# Patient Record
Sex: FEMALE | Race: WHITE | Employment: UNEMPLOYED | URBAN - METROPOLITAN AREA
[De-identification: names, ages, dates, MRNs, and addresses within clinical notes are randomized per-mention and may not be internally consistent; named-entity substitution may affect disease eponyms.]

---

## 2018-11-29 ENCOUNTER — HOSPITAL ENCOUNTER (OUTPATIENT)
Dept: OCCUPATIONAL MEDICINE | Age: 38
Discharge: HOME | End: 2018-11-29

## 2018-11-29 DIAGNOSIS — Z13.9 SCREENING PROCEDURE: ICD-10-CM

## 2020-04-24 ENCOUNTER — APPOINTMENT (OUTPATIENT)
Dept: GENERAL RADIOLOGY | Age: 40
DRG: 871 | End: 2020-04-24
Payer: COMMERCIAL

## 2020-04-24 ENCOUNTER — HOSPITAL ENCOUNTER (INPATIENT)
Age: 40
LOS: 5 days | Discharge: HOME | DRG: 871 | End: 2020-04-29
Attending: INTERNAL MEDICINE | Admitting: INTERNAL MEDICINE
Payer: COMMERCIAL

## 2020-04-24 ENCOUNTER — APPOINTMENT (OUTPATIENT)
Dept: CT IMAGING | Age: 40
DRG: 871 | End: 2020-04-24
Payer: COMMERCIAL

## 2020-04-24 PROBLEM — R05.9 COUGH: Status: ACTIVE | Noted: 2020-04-24

## 2020-04-24 LAB
ALBUMIN SERPL-MCNC: 4 GM/DL (ref 3.4–5)
ALP BLD-CCNC: 74 IU/L (ref 40–129)
ALT SERPL-CCNC: 13 U/L (ref 10–40)
ANION GAP SERPL CALCULATED.3IONS-SCNC: 13 MMOL/L (ref 4–16)
AST SERPL-CCNC: 14 IU/L (ref 15–37)
BASOPHILS ABSOLUTE: 0 K/CU MM
BASOPHILS RELATIVE PERCENT: 0.2 % (ref 0–1)
BILIRUB SERPL-MCNC: 0.7 MG/DL (ref 0–1)
BUN BLDV-MCNC: 7 MG/DL (ref 6–23)
CALCIUM SERPL-MCNC: 8.5 MG/DL (ref 8.3–10.6)
CHLORIDE BLD-SCNC: 99 MMOL/L (ref 99–110)
CO2: 25 MMOL/L (ref 21–32)
CREAT SERPL-MCNC: 0.9 MG/DL (ref 0.6–1.1)
D DIMER: 328 NG/ML(DDU)
DIFFERENTIAL TYPE: ABNORMAL
EOSINOPHILS ABSOLUTE: 0.1 K/CU MM
EOSINOPHILS RELATIVE PERCENT: 0.8 % (ref 0–3)
GFR AFRICAN AMERICAN: >60 ML/MIN/1.73M2
GFR NON-AFRICAN AMERICAN: >60 ML/MIN/1.73M2
GLUCOSE BLD-MCNC: 114 MG/DL (ref 70–99)
HCG QUALITATIVE: NEGATIVE
HCT VFR BLD CALC: 42.5 % (ref 37–47)
HEMOGLOBIN: 14.5 GM/DL (ref 12.5–16)
HIGH SENSITIVE C-REACTIVE PROTEIN: 111.1 MG/L
IMMATURE NEUTROPHIL %: 0.4 % (ref 0–0.43)
LACTIC ACID, SEPSIS: 0.9 MMOL/L (ref 0.5–1.9)
LYMPHOCYTES ABSOLUTE: 1 K/CU MM
LYMPHOCYTES RELATIVE PERCENT: 7.5 % (ref 24–44)
MCH RBC QN AUTO: 32.9 PG (ref 27–31)
MCHC RBC AUTO-ENTMCNC: 34.1 % (ref 32–36)
MCV RBC AUTO: 96.4 FL (ref 78–100)
MONOCYTES ABSOLUTE: 1 K/CU MM
MONOCYTES RELATIVE PERCENT: 7.9 % (ref 0–4)
NUCLEATED RBC %: 0 %
PDW BLD-RTO: 12.4 % (ref 11.7–14.9)
PLATELET # BLD: 292 K/CU MM (ref 140–440)
PMV BLD AUTO: 9.8 FL (ref 7.5–11.1)
POTASSIUM SERPL-SCNC: 3.6 MMOL/L (ref 3.5–5.1)
PRO-BNP: 15.16 PG/ML
PROCALCITONIN: 0.06
RBC # BLD: 4.41 M/CU MM (ref 4.2–5.4)
SEGMENTED NEUTROPHILS ABSOLUTE COUNT: 10.6 K/CU MM
SEGMENTED NEUTROPHILS RELATIVE PERCENT: 83.2 % (ref 36–66)
SODIUM BLD-SCNC: 137 MMOL/L (ref 135–145)
TOTAL IMMATURE NEUTOROPHIL: 0.05 K/CU MM
TOTAL NUCLEATED RBC: 0 K/CU MM
TOTAL PROTEIN: 6.9 GM/DL (ref 6.4–8.2)
TROPONIN T: <0.01 NG/ML
WBC # BLD: 12.7 K/CU MM (ref 4–10.5)

## 2020-04-24 PROCEDURE — 6360000004 HC RX CONTRAST MEDICATION: Performed by: PHYSICIAN ASSISTANT

## 2020-04-24 PROCEDURE — 6360000002 HC RX W HCPCS: Performed by: PHYSICIAN ASSISTANT

## 2020-04-24 PROCEDURE — 93010 ELECTROCARDIOGRAM REPORT: CPT | Performed by: INTERNAL MEDICINE

## 2020-04-24 PROCEDURE — 87899 AGENT NOS ASSAY W/OPTIC: CPT

## 2020-04-24 PROCEDURE — 2060000000 HC ICU INTERMEDIATE R&B

## 2020-04-24 PROCEDURE — 71275 CT ANGIOGRAPHY CHEST: CPT

## 2020-04-24 PROCEDURE — 96366 THER/PROPH/DIAG IV INF ADDON: CPT

## 2020-04-24 PROCEDURE — 80053 COMPREHEN METABOLIC PANEL: CPT

## 2020-04-24 PROCEDURE — 83605 ASSAY OF LACTIC ACID: CPT

## 2020-04-24 PROCEDURE — 84703 CHORIONIC GONADOTROPIN ASSAY: CPT

## 2020-04-24 PROCEDURE — U0002 COVID-19 LAB TEST NON-CDC: HCPCS

## 2020-04-24 PROCEDURE — 96365 THER/PROPH/DIAG IV INF INIT: CPT

## 2020-04-24 PROCEDURE — 71045 X-RAY EXAM CHEST 1 VIEW: CPT

## 2020-04-24 PROCEDURE — 2580000003 HC RX 258: Performed by: PHYSICIAN ASSISTANT

## 2020-04-24 PROCEDURE — 84145 PROCALCITONIN (PCT): CPT

## 2020-04-24 PROCEDURE — 99291 CRITICAL CARE FIRST HOUR: CPT

## 2020-04-24 PROCEDURE — 1200000000 HC SEMI PRIVATE

## 2020-04-24 PROCEDURE — 85025 COMPLETE CBC W/AUTO DIFF WBC: CPT

## 2020-04-24 PROCEDURE — 94761 N-INVAS EAR/PLS OXIMETRY MLT: CPT

## 2020-04-24 PROCEDURE — 84484 ASSAY OF TROPONIN QUANT: CPT

## 2020-04-24 PROCEDURE — 85379 FIBRIN DEGRADATION QUANT: CPT

## 2020-04-24 PROCEDURE — 93005 ELECTROCARDIOGRAM TRACING: CPT | Performed by: PHYSICIAN ASSISTANT

## 2020-04-24 PROCEDURE — 83880 ASSAY OF NATRIURETIC PEPTIDE: CPT

## 2020-04-24 PROCEDURE — 86141 C-REACTIVE PROTEIN HS: CPT

## 2020-04-24 PROCEDURE — 2580000003 HC RX 258: Performed by: NURSE PRACTITIONER

## 2020-04-24 PROCEDURE — 87040 BLOOD CULTURE FOR BACTERIA: CPT

## 2020-04-24 RX ORDER — ACETAMINOPHEN 325 MG/1
650 TABLET ORAL EVERY 6 HOURS PRN
Status: DISCONTINUED | OUTPATIENT
Start: 2020-04-24 | End: 2020-04-29 | Stop reason: HOSPADM

## 2020-04-24 RX ORDER — ACETAMINOPHEN 650 MG/1
650 SUPPOSITORY RECTAL EVERY 6 HOURS PRN
Status: DISCONTINUED | OUTPATIENT
Start: 2020-04-24 | End: 2020-04-29 | Stop reason: HOSPADM

## 2020-04-24 RX ORDER — AZITHROMYCIN 250 MG/1
250 TABLET, FILM COATED ORAL DAILY
Status: CANCELLED | OUTPATIENT
Start: 2020-04-25 | End: 2020-04-29

## 2020-04-24 RX ORDER — ONDANSETRON 2 MG/ML
4 INJECTION INTRAMUSCULAR; INTRAVENOUS EVERY 6 HOURS PRN
Status: DISCONTINUED | OUTPATIENT
Start: 2020-04-24 | End: 2020-04-29 | Stop reason: HOSPADM

## 2020-04-24 RX ORDER — SODIUM CHLORIDE 0.9 % (FLUSH) 0.9 %
10 SYRINGE (ML) INJECTION EVERY 12 HOURS SCHEDULED
Status: DISCONTINUED | OUTPATIENT
Start: 2020-04-24 | End: 2020-04-29 | Stop reason: HOSPADM

## 2020-04-24 RX ORDER — 0.9 % SODIUM CHLORIDE 0.9 %
10 VIAL (ML) INJECTION
Status: COMPLETED | OUTPATIENT
Start: 2020-04-24 | End: 2020-04-24

## 2020-04-24 RX ORDER — LEVOFLOXACIN 5 MG/ML
750 INJECTION, SOLUTION INTRAVENOUS ONCE
Status: COMPLETED | OUTPATIENT
Start: 2020-04-24 | End: 2020-04-24

## 2020-04-24 RX ORDER — LEVOFLOXACIN 5 MG/ML
750 INJECTION, SOLUTION INTRAVENOUS EVERY 24 HOURS
Status: DISCONTINUED | OUTPATIENT
Start: 2020-04-25 | End: 2020-04-25

## 2020-04-24 RX ORDER — SODIUM CHLORIDE 0.9 % (FLUSH) 0.9 %
10 SYRINGE (ML) INJECTION PRN
Status: DISCONTINUED | OUTPATIENT
Start: 2020-04-24 | End: 2020-04-29 | Stop reason: HOSPADM

## 2020-04-24 RX ORDER — PROMETHAZINE HYDROCHLORIDE 25 MG/1
12.5 TABLET ORAL EVERY 6 HOURS PRN
Status: DISCONTINUED | OUTPATIENT
Start: 2020-04-24 | End: 2020-04-29 | Stop reason: HOSPADM

## 2020-04-24 RX ORDER — 0.9 % SODIUM CHLORIDE 0.9 %
1000 INTRAVENOUS SOLUTION INTRAVENOUS ONCE
Status: COMPLETED | OUTPATIENT
Start: 2020-04-24 | End: 2020-04-24

## 2020-04-24 RX ORDER — LEVOFLOXACIN 5 MG/ML
500 INJECTION, SOLUTION INTRAVENOUS ONCE
Status: DISCONTINUED | OUTPATIENT
Start: 2020-04-24 | End: 2020-04-24

## 2020-04-24 RX ORDER — POLYETHYLENE GLYCOL 3350 17 G/17G
17 POWDER, FOR SOLUTION ORAL DAILY PRN
Status: DISCONTINUED | OUTPATIENT
Start: 2020-04-24 | End: 2020-04-29 | Stop reason: HOSPADM

## 2020-04-24 RX ORDER — SODIUM CHLORIDE 9 MG/ML
INJECTION, SOLUTION INTRAVENOUS CONTINUOUS
Status: DISCONTINUED | OUTPATIENT
Start: 2020-04-24 | End: 2020-04-25

## 2020-04-24 RX ADMIN — SODIUM CHLORIDE 1000 ML: 9 INJECTION, SOLUTION INTRAVENOUS at 16:04

## 2020-04-24 RX ADMIN — LEVOFLOXACIN 750 MG: 5 INJECTION, SOLUTION INTRAVENOUS at 17:55

## 2020-04-24 RX ADMIN — SODIUM CHLORIDE: 9 INJECTION, SOLUTION INTRAVENOUS at 22:34

## 2020-04-24 RX ADMIN — IOPAMIDOL 75 ML: 755 INJECTION, SOLUTION INTRAVENOUS at 15:57

## 2020-04-24 RX ADMIN — SODIUM CHLORIDE, PRESERVATIVE FREE 10 ML: 5 INJECTION INTRAVENOUS at 22:35

## 2020-04-24 RX ADMIN — SODIUM CHLORIDE 10 ML: 9 INJECTION INTRAVENOUS at 15:57

## 2020-04-24 ASSESSMENT — PAIN SCALES - GENERAL
PAINLEVEL_OUTOF10: 0
PAINLEVEL_OUTOF10: 4

## 2020-04-24 ASSESSMENT — PAIN DESCRIPTION - DESCRIPTORS: DESCRIPTORS: ACHING

## 2020-04-24 ASSESSMENT — PAIN DESCRIPTION - PAIN TYPE: TYPE: ACUTE PAIN

## 2020-04-24 ASSESSMENT — PAIN DESCRIPTION - LOCATION: LOCATION: GENERALIZED

## 2020-04-24 NOTE — ED PROVIDER NOTES
Alb 4.0 3.4 - 5.0 GM/DL    Total Protein 6.9 6.4 - 8.2 GM/DL    Total Bilirubin 0.7 0.0 - 1.0 MG/DL    ALT 13 10 - 40 U/L    AST 14 (L) 15 - 37 IU/L    Alkaline Phosphatase 74 40 - 129 IU/L    GFR Non-African American >60 >60 mL/min/1.73m2    GFR African American >60 >60 mL/min/1.73m2    Anion Gap 13 4 - 16   HCG Serum, Qualitative   Result Value Ref Range    hCG Qual NEGATIVE    Brain Natriuretic Peptide   Result Value Ref Range    Pro-BNP 15.16 <300 PG/ML   Troponin   Result Value Ref Range    Troponin T <0.010 <0.01 NG/ML   D-dimer, Quantitative   Result Value Ref Range    D-Dimer, Quant 328 (H) <230 NG/mL(DDU)   Lactate, Sepsis   Result Value Ref Range    Lactic Acid, Sepsis 0.9 0.5 - 1.9 mMOL/L   EKG 12 Lead   Result Value Ref Range    Ventricular Rate 110 BPM    Atrial Rate 110 BPM    P-R Interval 170 ms    QRS Duration 82 ms    Q-T Interval 344 ms    QTc Calculation (Bazett) 465 ms    P Axis 57 degrees    R Axis 51 degrees    T Axis 20 degrees    Diagnosis       Sinus tachycardia  Possible Left atrial enlargement  Borderline ECG  No previous ECGs available  Confirmed by [facility] Mallika SIGALA ([id number]) on 4/24/2020 4:09:40 PM         EKG Interpretation  Please see ED physician's note for EKG interpretation - Dr. Reva Murphy      RADIOLOGY/PROCEDURES    CTA PULMONARY W CONTRAST   Final Result   1. Suboptimal bolus timing limits evaluation of the segmental and   subsegmental pulmonary arteries. No definite large embolus within limits of   this exam.  No large embolus within the right or left main pulmonary arteries. 2. Bilateral interlobular septal thickening with mild scattered bilateral   ground-glass opacities. This is an indeterminate appearance with imaging   findings which can be seen with viral pneumonia, though nonspecific and can   occur in a variety of infectious and noninfectious processes including   pulmonary edema.          XR CHEST PORTABLE   Preliminary Result   Bilateral interstitial and airspace Disposition:  Admission      Comment: Please note this report has been produced using speech recognition software and may contain errors related to that system including errors in grammar, punctuation, and spelling, as well as words and phrases that may be inappropriate. If there are any questions or concerns please feel free to contact the dictating provider for clarification.         Bill Cruz PA-C  04/24/20 1942

## 2020-04-24 NOTE — H&P
History and Physical      Name:  Aroldo Kuo /Age/Sex: 1980  (47 y.o. female)   MRN & CSN:  [id number] & [id number] Admission Date/Time: 2020  1:31 PM   Location:  ED04/ED-04 PCP: Sadia Light MD       Discussed patient with Dr. María Wilson and Plan:     Aroldo Kuo is a 47 y.o.  female  who presents with fever, cough, malaise, dyspnea on exertion    - Covid rule out  CTA chest without PE; has bilat ground glass opacities  Reports fever, dry cough, malaise  Emergent testing done in ED  Droplet and contact precautions  Empiric levaquin 750 IV q day   Check Blood cultures, procal, crp, RESP pcr, legionella, strep   IVF 0.9 overnight      Chronic  none    Discussed patient with ER physician     Diet General    DVT Prophylaxis [x] Lovenox, []  Heparin, [] SCDs, [] Ambulation   GI Prophylaxis [] PPI,  [] H2 Blocker,  [] Carafate,  [] Diet/Tube Feeds   Code Status Full   Disposition Patient requires continued admission due to cough, shortness of breath   MDM [] Low, [x] Moderate,[]  High  Patient's risk as above due to      [] One or more chronic illnesses with exacerbation progression      [x] Two or more stable chronic illnesses      [x] Undiagnosed new problem with uncertain prognosis      [] Elective major surgery      []Prescription drug management       History of Present Illness:     Chief Complaint: fever, cough, malaise, dyspnea on exertion  Aroldo Kuo is a 47 y.o.  female  who presents with the above complaints, onset 7 days ago for malaise, 2 days for dry cough and fever. Denies known exposure to PUI patients. Reports PMH of hypothyroidism but states she was taken off synthroid due to normal TSH levels. Reports mild SOB with exertion. Denies chest pain/pressure, abdominal pain, back pain, n/v, diarrhea. Denies regular alcohol use, illicit drug use.       Ten point ROS reviewed negative, unless as noted above    Objective:     No intake or output data in the 24 hours embolus within the right or left main pulmonary arteries.   2. Bilateral interlobular septal thickening with mild scattered bilateral  ground-glass opacities.  This is an indeterminate appearance with imaging  findings which can be seen with viral pneumonia, though nonspecific and can  occur in a variety of infectious and noninfectious processes including  pulmonary edema.     XR CHEST PORTABLE [[id number]] Collected: 04/24/20 1411     Order Status: Completed Updated: 04/24/20 1436     Narrative:       EXAMINATION:  ONE XRAY VIEW OF THE CHEST    4/24/2020 2:04 pm    COMPARISON:  November 29, 2018    HISTORY:  ORDERING SYSTEM PROVIDED HISTORY: shortness of breath  TECHNOLOGIST PROVIDED HISTORY:  Reason for exam:->shortness of breath  Reason for Exam: SOB  Acuity: Acute  Type of Exam: Initial    FINDINGS:  Bilaterally asymmetric interstitial and airspace disease confluent within the  suprahilar regions bilaterally and right lower lobe.  Differential includes  atypical infection or noncardiogenic edema.  Stable heart size and  mediastinal contours.     Impression:       Bilateral interstitial and airspace disease representing atypical infection  or noncardiogenic edema.         Sinus tachycardia   Possible Left atrial enlargement   Borderline ECG   No previous ECGs available   Confirmed by [facility] Kelvin SIGALA ([id number]) on 4/24/2020 4:09:40 PM     Electronically signed by MARS Shelton NP on 4/24/2020 at 6:26 PM

## 2020-04-24 NOTE — ED NOTES
Emergent disease panel sent down to lab; Elena COSTELLO got approval from [address]; lab made aware of the approval; [id number] Sanam Todd also aware of the emergent disease panel; Ce Stokes  04/24/20 9006

## 2020-04-24 NOTE — ED NOTES
[facility] (1-RH) approval code Evorn Innocent), Updated [address] spoke to Meg.       Moni Ibrahim RN  04/24/20 [id number]

## 2020-04-25 ENCOUNTER — APPOINTMENT (OUTPATIENT)
Dept: GENERAL RADIOLOGY | Age: 40
DRG: 871 | End: 2020-04-25
Payer: COMMERCIAL

## 2020-04-25 LAB
ANION GAP SERPL CALCULATED.3IONS-SCNC: 13 MMOL/L (ref 4–16)
BASOPHILS ABSOLUTE: 0 K/CU MM
BASOPHILS RELATIVE PERCENT: 0.2 % (ref 0–1)
BUN BLDV-MCNC: 2 MG/DL (ref 6–23)
CALCIUM SERPL-MCNC: 8.2 MG/DL (ref 8.3–10.6)
CHLORIDE BLD-SCNC: 100 MMOL/L (ref 99–110)
CO2: 21 MMOL/L (ref 21–32)
CREAT SERPL-MCNC: 0.5 MG/DL (ref 0.6–1.1)
DIFFERENTIAL TYPE: ABNORMAL
EOSINOPHILS ABSOLUTE: 0.1 K/CU MM
EOSINOPHILS RELATIVE PERCENT: 1.2 % (ref 0–3)
FERRITIN: 414 NG/ML (ref 15–150)
GFR AFRICAN AMERICAN: >60 ML/MIN/1.73M2
GFR NON-AFRICAN AMERICAN: >60 ML/MIN/1.73M2
GLUCOSE BLD-MCNC: 115 MG/DL (ref 70–99)
HCT VFR BLD CALC: 42.2 % (ref 37–47)
HEMOGLOBIN: 13.9 GM/DL (ref 12.5–16)
IMMATURE NEUTROPHIL %: 0.4 % (ref 0–0.43)
LACTATE DEHYDROGENASE: 127 IU/L (ref 120–246)
LYMPHOCYTES ABSOLUTE: 1 K/CU MM
LYMPHOCYTES RELATIVE PERCENT: 8.8 % (ref 24–44)
MCH RBC QN AUTO: 33.1 PG (ref 27–31)
MCHC RBC AUTO-ENTMCNC: 32.9 % (ref 32–36)
MCV RBC AUTO: 100.5 FL (ref 78–100)
MONOCYTES ABSOLUTE: 0.8 K/CU MM
MONOCYTES RELATIVE PERCENT: 7.5 % (ref 0–4)
NUCLEATED RBC %: 0 %
PDW BLD-RTO: 12.7 % (ref 11.7–14.9)
PLATELET # BLD: 272 K/CU MM (ref 140–440)
PMV BLD AUTO: 10.5 FL (ref 7.5–11.1)
POTASSIUM SERPL-SCNC: 3.8 MMOL/L (ref 3.5–5.1)
RBC # BLD: 4.2 M/CU MM (ref 4.2–5.4)
SEGMENTED NEUTROPHILS ABSOLUTE COUNT: 9 K/CU MM
SEGMENTED NEUTROPHILS RELATIVE PERCENT: 81.9 % (ref 36–66)
SODIUM BLD-SCNC: 134 MMOL/L (ref 135–145)
TOTAL IMMATURE NEUTOROPHIL: 0.04 K/CU MM
TOTAL NUCLEATED RBC: 0 K/CU MM
WBC # BLD: 11 K/CU MM (ref 4–10.5)

## 2020-04-25 PROCEDURE — 2580000003 HC RX 258: Performed by: HOSPITALIST

## 2020-04-25 PROCEDURE — 6360000002 HC RX W HCPCS: Performed by: NURSE PRACTITIONER

## 2020-04-25 PROCEDURE — 6370000000 HC RX 637 (ALT 250 FOR IP): Performed by: NURSE PRACTITIONER

## 2020-04-25 PROCEDURE — 87633 RESP VIRUS 12-25 TARGETS: CPT

## 2020-04-25 PROCEDURE — 94761 N-INVAS EAR/PLS OXIMETRY MLT: CPT

## 2020-04-25 PROCEDURE — 83615 LACTATE (LD) (LDH) ENZYME: CPT

## 2020-04-25 PROCEDURE — 87486 CHLMYD PNEUM DNA AMP PROBE: CPT

## 2020-04-25 PROCEDURE — 6360000002 HC RX W HCPCS: Performed by: HOSPITALIST

## 2020-04-25 PROCEDURE — 87449 NOS EACH ORGANISM AG IA: CPT

## 2020-04-25 PROCEDURE — 2580000003 HC RX 258: Performed by: NURSE PRACTITIONER

## 2020-04-25 PROCEDURE — 87581 M.PNEUMON DNA AMP PROBE: CPT

## 2020-04-25 PROCEDURE — 85025 COMPLETE CBC W/AUTO DIFF WBC: CPT

## 2020-04-25 PROCEDURE — 71045 X-RAY EXAM CHEST 1 VIEW: CPT

## 2020-04-25 PROCEDURE — 87798 DETECT AGENT NOS DNA AMP: CPT

## 2020-04-25 PROCEDURE — 1200000000 HC SEMI PRIVATE

## 2020-04-25 PROCEDURE — 82728 ASSAY OF FERRITIN: CPT

## 2020-04-25 PROCEDURE — 87081 CULTURE SCREEN ONLY: CPT

## 2020-04-25 PROCEDURE — 80048 BASIC METABOLIC PNL TOTAL CA: CPT

## 2020-04-25 RX ORDER — LEVOFLOXACIN 5 MG/ML
500 INJECTION, SOLUTION INTRAVENOUS EVERY 24 HOURS
Status: DISCONTINUED | OUTPATIENT
Start: 2020-04-25 | End: 2020-04-29 | Stop reason: HOSPADM

## 2020-04-25 RX ORDER — SODIUM CHLORIDE 9 MG/ML
INJECTION, SOLUTION INTRAVENOUS CONTINUOUS
Status: ACTIVE | OUTPATIENT
Start: 2020-04-25 | End: 2020-04-26

## 2020-04-25 RX ADMIN — VANCOMYCIN HYDROCHLORIDE 1500 MG: 5 INJECTION, POWDER, LYOPHILIZED, FOR SOLUTION INTRAVENOUS at 23:51

## 2020-04-25 RX ADMIN — ACETAMINOPHEN 650 MG: 325 TABLET ORAL at 16:44

## 2020-04-25 RX ADMIN — SODIUM CHLORIDE: 9 INJECTION, SOLUTION INTRAVENOUS at 22:35

## 2020-04-25 RX ADMIN — ACETAMINOPHEN 650 MG: 325 TABLET ORAL at 03:48

## 2020-04-25 RX ADMIN — ENOXAPARIN SODIUM 40 MG: 40 INJECTION SUBCUTANEOUS at 08:37

## 2020-04-25 RX ADMIN — LEVOFLOXACIN 500 MG: 5 INJECTION, SOLUTION INTRAVENOUS at 18:25

## 2020-04-25 RX ADMIN — VANCOMYCIN HYDROCHLORIDE 1500 MG: 5 INJECTION, POWDER, LYOPHILIZED, FOR SOLUTION INTRAVENOUS at 12:10

## 2020-04-25 RX ADMIN — SODIUM CHLORIDE, PRESERVATIVE FREE 10 ML: 5 INJECTION INTRAVENOUS at 19:43

## 2020-04-25 RX ADMIN — SODIUM CHLORIDE: 9 INJECTION, SOLUTION INTRAVENOUS at 06:29

## 2020-04-25 RX ADMIN — PIPERACILLIN AND TAZOBACTAM 3.38 G: 3; .375 INJECTION, POWDER, FOR SOLUTION INTRAVENOUS at 22:34

## 2020-04-25 RX ADMIN — PIPERACILLIN AND TAZOBACTAM 3.38 G: 3; .375 INJECTION, POWDER, FOR SOLUTION INTRAVENOUS at 14:29

## 2020-04-25 ASSESSMENT — PAIN SCALES - GENERAL
PAINLEVEL_OUTOF10: 0
PAINLEVEL_OUTOF10: 1

## 2020-04-25 NOTE — CONSULTS
education level: None   Occupational History    None   Social Needs    Financial resource strain: None    Food insecurity     Worry: None     Inability: None    Transportation needs     Medical: None     Non-medical: None   Tobacco Use    Smoking status: Current Every Day Smoker     Packs/day: 0.50   Substance and Sexual Activity    Alcohol use: Yes     Comment: social    Drug use: No    Sexual activity: None   Lifestyle    Physical activity     Days per week: None     Minutes per session: None    Stress: None   Relationships    Social connections     Talks on phone: None     Gets together: None     Attends Holiness service: None     Active member of club or organization: None     Attends meetings of clubs or organizations: None     Relationship status: None    Intimate partner violence     Fear of current or ex partner: None     Emotionally abused: None     Physically abused: None     Forced sexual activity: None   Other Topics Concern    None   Social History Narrative    None       Family History:   History reviewed. No pertinent family history. Immunization:  Immunization History   Administered Date(s) Administered    Tdap (Boostrix, Adacel) 08/12/2013         REVIEW OF SYSTEMS:    CONSTITUTIONAL:  negative for fevers, chills, diaphoresis, activity change, appetite change, fatigue, night sweats and unexpected weight change.    EYES:  negative for blurred vision, eye discharge, visual disturbance and icterus  HEENT:  negative for hearing loss, tinnitus, ear drainage, sinus pressure, nasal congestion, epistaxis and snoring  RESPIRATORY:  See HPI  CARDIOVASCULAR:  negative for chest pain, palpitations, exertional chest pressure/discomfort, edema, syncope  GASTROINTESTINAL:  negative for nausea, vomiting, diarrhea, constipation, blood in stool and abdominal pain  GENITOURINARY:  negative for frequency, dysuria and hematuria  HEMATOLOGIC/LYMPHATIC:  negative for easy bruising, bleeding and interlobular septal thickening with mild scattered bilateral   ground-glass opacities.  This is an indeterminate appearance with imaging   findings which can be seen with viral pneumonia, though nonspecific and can   occur in a variety of infectious and noninfectious processes including   pulmonary edema.           Order History     Open Order Details   PACS Images      Show images for CTA PULMONARY W CONTRAST   Results History Report     View Report   External Result Report     External Result Report                         Assessment:     1. bridget pneumonia  2.  R/o covid 19 disease          Plan:     D/w pt  Adequate o2 adm  cx  Awaits covid 19 results  Agree with antibx  Thanks will follow

## 2020-04-26 LAB
ADENOVIRUS DETECTION BY PCR: NOT DETECTED
BORDETELLA PERTUSSIS PCR: NOT DETECTED
CHLAMYDOPHILA PNEUMONIA PCR: NOT DETECTED
CORONAVIRUS 229E PCR: NOT DETECTED
CORONAVIRUS HKU1 PCR: NOT DETECTED
CORONAVIRUS NL63 PCR: NOT DETECTED
CORONAVIRUS OC43 PCR: NOT DETECTED
CREAT SERPL-MCNC: 0.4 MG/DL (ref 0.6–1.1)
CULTURE: NORMAL
EMERGENT DISEASE RESULT: NOT DETECTED
GFR AFRICAN AMERICAN: >60 ML/MIN/1.73M2
GFR NON-AFRICAN AMERICAN: >60 ML/MIN/1.73M2
HUMAN METAPNEUMOVIRUS PCR: NOT DETECTED
INFLUENZA A BY PCR: NOT DETECTED
INFLUENZA A H1 (2009) PCR: NOT DETECTED
INFLUENZA A H1 PANDEMIC PCR: NOT DETECTED
INFLUENZA A H3 PCR: NOT DETECTED
INFLUENZA B BY PCR: NOT DETECTED
LEGIONELLA URINARY AG: NEGATIVE
Lab: NORMAL
MYCOPLASMA PNEUMONIAE PCR: NOT DETECTED
PARAINFLUENZA 1 PCR: NOT DETECTED
PARAINFLUENZA 2 PCR: NOT DETECTED
PARAINFLUENZA 3 PCR: NOT DETECTED
PARAINFLUENZA 4 PCR: NOT DETECTED
RHINOVIRUS ENTEROVIRUS PCR: NOT DETECTED
RSV PCR: NOT DETECTED
SPECIMEN: NORMAL
STREP PNEUMONIAE ANTIGEN: NORMAL

## 2020-04-26 PROCEDURE — 82565 ASSAY OF CREATININE: CPT

## 2020-04-26 PROCEDURE — 6360000002 HC RX W HCPCS: Performed by: HOSPITALIST

## 2020-04-26 PROCEDURE — 2580000003 HC RX 258: Performed by: HOSPITALIST

## 2020-04-26 PROCEDURE — 6360000002 HC RX W HCPCS: Performed by: NURSE PRACTITIONER

## 2020-04-26 PROCEDURE — 1200000000 HC SEMI PRIVATE

## 2020-04-26 PROCEDURE — 6370000000 HC RX 637 (ALT 250 FOR IP): Performed by: NURSE PRACTITIONER

## 2020-04-26 PROCEDURE — 94761 N-INVAS EAR/PLS OXIMETRY MLT: CPT

## 2020-04-26 PROCEDURE — 2700000000 HC OXYGEN THERAPY PER DAY

## 2020-04-26 RX ADMIN — ACETAMINOPHEN 650 MG: 325 TABLET ORAL at 21:00

## 2020-04-26 RX ADMIN — ENOXAPARIN SODIUM 40 MG: 40 INJECTION SUBCUTANEOUS at 09:53

## 2020-04-26 RX ADMIN — PIPERACILLIN AND TAZOBACTAM 3.38 G: 3; .375 INJECTION, POWDER, FOR SOLUTION INTRAVENOUS at 21:00

## 2020-04-26 RX ADMIN — VANCOMYCIN HYDROCHLORIDE 1500 MG: 5 INJECTION, POWDER, LYOPHILIZED, FOR SOLUTION INTRAVENOUS at 23:14

## 2020-04-26 RX ADMIN — LEVOFLOXACIN 500 MG: 5 INJECTION, SOLUTION INTRAVENOUS at 18:54

## 2020-04-26 RX ADMIN — PIPERACILLIN AND TAZOBACTAM 3.38 G: 3; .375 INJECTION, POWDER, FOR SOLUTION INTRAVENOUS at 05:49

## 2020-04-26 RX ADMIN — PIPERACILLIN AND TAZOBACTAM 3.38 G: 3; .375 INJECTION, POWDER, FOR SOLUTION INTRAVENOUS at 15:03

## 2020-04-26 RX ADMIN — VANCOMYCIN HYDROCHLORIDE 1500 MG: 5 INJECTION, POWDER, LYOPHILIZED, FOR SOLUTION INTRAVENOUS at 11:39

## 2020-04-26 ASSESSMENT — PAIN SCALES - GENERAL
PAINLEVEL_OUTOF10: 3
PAINLEVEL_OUTOF10: 1
PAINLEVEL_OUTOF10: 0

## 2020-04-27 LAB
ANION GAP SERPL CALCULATED.3IONS-SCNC: 10 MMOL/L (ref 4–16)
BASOPHILS ABSOLUTE: 0 K/CU MM
BASOPHILS RELATIVE PERCENT: 0.4 % (ref 0–1)
BUN BLDV-MCNC: 2 MG/DL (ref 6–23)
CALCIUM SERPL-MCNC: 9 MG/DL (ref 8.3–10.6)
CHLORIDE BLD-SCNC: 104 MMOL/L (ref 99–110)
CO2: 25 MMOL/L (ref 21–32)
CREAT SERPL-MCNC: 0.4 MG/DL (ref 0.6–1.1)
CREAT SERPL-MCNC: 0.5 MG/DL (ref 0.6–1.1)
DIFFERENTIAL TYPE: ABNORMAL
DOSE AMOUNT: ABNORMAL
DOSE TIME: ABNORMAL
EOSINOPHILS ABSOLUTE: 0.1 K/CU MM
EOSINOPHILS RELATIVE PERCENT: 1.8 % (ref 0–3)
GFR AFRICAN AMERICAN: >60 ML/MIN/1.73M2
GFR AFRICAN AMERICAN: >60 ML/MIN/1.73M2
GFR NON-AFRICAN AMERICAN: >60 ML/MIN/1.73M2
GFR NON-AFRICAN AMERICAN: >60 ML/MIN/1.73M2
GLUCOSE BLD-MCNC: 80 MG/DL (ref 70–99)
HCT VFR BLD CALC: 40.4 % (ref 37–47)
HEMOGLOBIN: 13.2 GM/DL (ref 12.5–16)
IMMATURE NEUTROPHIL %: 1.3 % (ref 0–0.43)
LYMPHOCYTES ABSOLUTE: 0.7 K/CU MM
LYMPHOCYTES RELATIVE PERCENT: 9.1 % (ref 24–44)
MCH RBC QN AUTO: 32.3 PG (ref 27–31)
MCHC RBC AUTO-ENTMCNC: 32.7 % (ref 32–36)
MCV RBC AUTO: 98.8 FL (ref 78–100)
MONOCYTES ABSOLUTE: 0.3 K/CU MM
MONOCYTES RELATIVE PERCENT: 3.9 % (ref 0–4)
NUCLEATED RBC %: 0 %
PDW BLD-RTO: 12.5 % (ref 11.7–14.9)
PLATELET # BLD: 378 K/CU MM (ref 140–440)
PMV BLD AUTO: 9.5 FL (ref 7.5–11.1)
POTASSIUM SERPL-SCNC: 4.5 MMOL/L (ref 3.5–5.1)
RBC # BLD: 4.09 M/CU MM (ref 4.2–5.4)
SEGMENTED NEUTROPHILS ABSOLUTE COUNT: 6.4 K/CU MM
SEGMENTED NEUTROPHILS RELATIVE PERCENT: 83.5 % (ref 36–66)
SODIUM BLD-SCNC: 139 MMOL/L (ref 135–145)
TOTAL IMMATURE NEUTOROPHIL: 0.1 K/CU MM
TOTAL NUCLEATED RBC: 0 K/CU MM
VANCOMYCIN TROUGH: 5.3 UG/ML (ref 10–20)
WBC # BLD: 7.7 K/CU MM (ref 4–10.5)

## 2020-04-27 PROCEDURE — 1200000000 HC SEMI PRIVATE

## 2020-04-27 PROCEDURE — 2580000003 HC RX 258: Performed by: PHYSICIAN ASSISTANT

## 2020-04-27 PROCEDURE — 6360000002 HC RX W HCPCS: Performed by: HOSPITALIST

## 2020-04-27 PROCEDURE — 80202 ASSAY OF VANCOMYCIN: CPT

## 2020-04-27 PROCEDURE — 80048 BASIC METABOLIC PNL TOTAL CA: CPT

## 2020-04-27 PROCEDURE — 2580000003 HC RX 258: Performed by: NURSE PRACTITIONER

## 2020-04-27 PROCEDURE — 2580000003 HC RX 258: Performed by: HOSPITALIST

## 2020-04-27 PROCEDURE — 85025 COMPLETE CBC W/AUTO DIFF WBC: CPT

## 2020-04-27 PROCEDURE — 82565 ASSAY OF CREATININE: CPT

## 2020-04-27 PROCEDURE — 6360000002 HC RX W HCPCS: Performed by: NURSE PRACTITIONER

## 2020-04-27 RX ORDER — METHYLPREDNISOLONE SODIUM SUCCINATE 40 MG/ML
40 INJECTION, POWDER, LYOPHILIZED, FOR SOLUTION INTRAMUSCULAR; INTRAVENOUS EVERY 12 HOURS
Status: DISCONTINUED | OUTPATIENT
Start: 2020-04-27 | End: 2020-04-28

## 2020-04-27 RX ADMIN — VANCOMYCIN HYDROCHLORIDE 1250 MG: 5 INJECTION, POWDER, LYOPHILIZED, FOR SOLUTION INTRAVENOUS at 13:25

## 2020-04-27 RX ADMIN — METHYLPREDNISOLONE SODIUM SUCCINATE 40 MG: 40 INJECTION, POWDER, FOR SOLUTION INTRAMUSCULAR; INTRAVENOUS at 23:47

## 2020-04-27 RX ADMIN — LEVOFLOXACIN 500 MG: 5 INJECTION, SOLUTION INTRAVENOUS at 21:20

## 2020-04-27 RX ADMIN — ENOXAPARIN SODIUM 40 MG: 40 INJECTION SUBCUTANEOUS at 08:53

## 2020-04-27 RX ADMIN — METHYLPREDNISOLONE SODIUM SUCCINATE 40 MG: 40 INJECTION, POWDER, FOR SOLUTION INTRAMUSCULAR; INTRAVENOUS at 12:28

## 2020-04-27 RX ADMIN — VANCOMYCIN HYDROCHLORIDE 1250 MG: 5 INJECTION, POWDER, LYOPHILIZED, FOR SOLUTION INTRAVENOUS at 22:26

## 2020-04-27 RX ADMIN — PIPERACILLIN AND TAZOBACTAM 3.38 G: 3; .375 INJECTION, POWDER, FOR SOLUTION INTRAVENOUS at 15:27

## 2020-04-27 RX ADMIN — SODIUM CHLORIDE, PRESERVATIVE FREE 10 ML: 5 INJECTION INTRAVENOUS at 21:21

## 2020-04-27 RX ADMIN — SODIUM CHLORIDE, PRESERVATIVE FREE 10 ML: 5 INJECTION INTRAVENOUS at 21:20

## 2020-04-27 RX ADMIN — PIPERACILLIN AND TAZOBACTAM 3.38 G: 3; .375 INJECTION, POWDER, FOR SOLUTION INTRAVENOUS at 06:32

## 2020-04-27 ASSESSMENT — PAIN SCALES - GENERAL: PAINLEVEL_OUTOF10: 0

## 2020-04-27 NOTE — CARE COORDINATION
CM reviewed chart and spoke w/ pt.  COVID 19 testing states NOT detected. Pt lives at home w/ her 3 teenage children. Pt states she is fully independent including driving. Pt states she does not have PCP, but does know how to find one through her insurance. CM placed [facility] walk in information on [facility]. Pt states she does worry about her hospital bill and if she will be able to afford new Rx. CM call Shelli Ray in public benefits and Med assist.  Pt's goal is to d/c to home. CM noted DURON while talking w/ pt. Pt states she knows how she got sick. States her roof was leaking and she took down part of her roof herself and saw a lot of mold. Pt states roof part is fixed and inside of home is not exposed to natural elements  CM will con't to follow loosely if home O2 should be needed.

## 2020-04-27 NOTE — PLAN OF CARE
Problem: Falls - Risk of:  Goal: Will remain free from falls  Description: Will remain free from falls  4/26/2020 2032 by Desmond Gallego RN  Outcome: Ongoing  4/26/2020 1151 by Erman Dance, RN  Outcome: Ongoing  Goal: Absence of physical injury  Description: Absence of physical injury  4/26/2020 2032 by Desmond Gallego RN  Outcome: Ongoing  4/26/2020 1151 by Erman Dance, RN  Outcome: Ongoing     Problem: Breathing Pattern - Ineffective:  Goal: Ability to achieve and maintain a regular respiratory rate will improve  Description: Ability to achieve and maintain a regular respiratory rate will improve  4/26/2020 2032 by Desmond Gallego RN  Outcome: Ongoing  4/26/2020 1151 by Erman Dance, RN  Outcome: Ongoing

## 2020-04-28 ENCOUNTER — APPOINTMENT (OUTPATIENT)
Dept: GENERAL RADIOLOGY | Age: 40
DRG: 871 | End: 2020-04-28
Payer: COMMERCIAL

## 2020-04-28 LAB
CREAT SERPL-MCNC: 0.5 MG/DL (ref 0.6–1.1)
GFR AFRICAN AMERICAN: >60 ML/MIN/1.73M2
GFR NON-AFRICAN AMERICAN: >60 ML/MIN/1.73M2

## 2020-04-28 PROCEDURE — 6360000002 HC RX W HCPCS: Performed by: NURSE PRACTITIONER

## 2020-04-28 PROCEDURE — 94640 AIRWAY INHALATION TREATMENT: CPT

## 2020-04-28 PROCEDURE — 6370000000 HC RX 637 (ALT 250 FOR IP): Performed by: INTERNAL MEDICINE

## 2020-04-28 PROCEDURE — 6360000002 HC RX W HCPCS: Performed by: HOSPITALIST

## 2020-04-28 PROCEDURE — 2580000003 HC RX 258: Performed by: HOSPITALIST

## 2020-04-28 PROCEDURE — 2580000003 HC RX 258: Performed by: NURSE PRACTITIONER

## 2020-04-28 PROCEDURE — 1200000000 HC SEMI PRIVATE

## 2020-04-28 PROCEDURE — 2700000000 HC OXYGEN THERAPY PER DAY

## 2020-04-28 PROCEDURE — 71045 X-RAY EXAM CHEST 1 VIEW: CPT

## 2020-04-28 PROCEDURE — 6360000002 HC RX W HCPCS: Performed by: INTERNAL MEDICINE

## 2020-04-28 PROCEDURE — 94761 N-INVAS EAR/PLS OXIMETRY MLT: CPT

## 2020-04-28 PROCEDURE — 82565 ASSAY OF CREATININE: CPT

## 2020-04-28 RX ORDER — METHYLPREDNISOLONE SODIUM SUCCINATE 40 MG/ML
40 INJECTION, POWDER, LYOPHILIZED, FOR SOLUTION INTRAMUSCULAR; INTRAVENOUS EVERY 8 HOURS
Status: DISCONTINUED | OUTPATIENT
Start: 2020-04-28 | End: 2020-04-29 | Stop reason: HOSPADM

## 2020-04-28 RX ORDER — IPRATROPIUM BROMIDE AND ALBUTEROL SULFATE 2.5; .5 MG/3ML; MG/3ML
1 SOLUTION RESPIRATORY (INHALATION)
Status: DISCONTINUED | OUTPATIENT
Start: 2020-04-28 | End: 2020-04-29 | Stop reason: HOSPADM

## 2020-04-28 RX ADMIN — IPRATROPIUM BROMIDE AND ALBUTEROL SULFATE 1 AMPULE: .5; 3 SOLUTION RESPIRATORY (INHALATION) at 09:11

## 2020-04-28 RX ADMIN — LEVOFLOXACIN 500 MG: 5 INJECTION, SOLUTION INTRAVENOUS at 17:47

## 2020-04-28 RX ADMIN — METHYLPREDNISOLONE SODIUM SUCCINATE 40 MG: 40 INJECTION, POWDER, FOR SOLUTION INTRAMUSCULAR; INTRAVENOUS at 10:55

## 2020-04-28 RX ADMIN — VANCOMYCIN HYDROCHLORIDE 1250 MG: 5 INJECTION, POWDER, LYOPHILIZED, FOR SOLUTION INTRAVENOUS at 05:59

## 2020-04-28 RX ADMIN — IPRATROPIUM BROMIDE AND ALBUTEROL SULFATE 1 AMPULE: .5; 3 SOLUTION RESPIRATORY (INHALATION) at 15:47

## 2020-04-28 RX ADMIN — SODIUM CHLORIDE, PRESERVATIVE FREE 10 ML: 5 INJECTION INTRAVENOUS at 21:15

## 2020-04-28 RX ADMIN — IPRATROPIUM BROMIDE AND ALBUTEROL SULFATE 1 AMPULE: .5; 3 SOLUTION RESPIRATORY (INHALATION) at 12:07

## 2020-04-28 RX ADMIN — PIPERACILLIN AND TAZOBACTAM 3.38 G: 3; .375 INJECTION, POWDER, FOR SOLUTION INTRAVENOUS at 08:02

## 2020-04-28 RX ADMIN — PIPERACILLIN AND TAZOBACTAM 3.38 G: 3; .375 INJECTION, POWDER, FOR SOLUTION INTRAVENOUS at 00:37

## 2020-04-28 RX ADMIN — METHYLPREDNISOLONE SODIUM SUCCINATE 40 MG: 40 INJECTION, POWDER, LYOPHILIZED, FOR SOLUTION INTRAMUSCULAR; INTRAVENOUS at 17:47

## 2020-04-28 RX ADMIN — ENOXAPARIN SODIUM 40 MG: 40 INJECTION SUBCUTANEOUS at 08:02

## 2020-04-28 RX ADMIN — IPRATROPIUM BROMIDE AND ALBUTEROL SULFATE 1 AMPULE: .5; 3 SOLUTION RESPIRATORY (INHALATION) at 19:07

## 2020-04-28 ASSESSMENT — PAIN SCALES - GENERAL
PAINLEVEL_OUTOF10: 0

## 2020-04-29 VITALS
OXYGEN SATURATION: 92 % | WEIGHT: 184.9 LBS | TEMPERATURE: 97.8 F | BODY MASS INDEX: 34.91 KG/M2 | HEIGHT: 61 IN | DIASTOLIC BLOOD PRESSURE: 64 MMHG | HEART RATE: 85 BPM | RESPIRATION RATE: 15 BRPM | SYSTOLIC BLOOD PRESSURE: 115 MMHG

## 2020-04-29 LAB
CULTURE: NORMAL
CULTURE: NORMAL
Lab: NORMAL
Lab: NORMAL
SPECIMEN: NORMAL
SPECIMEN: NORMAL

## 2020-04-29 PROCEDURE — 6360000002 HC RX W HCPCS: Performed by: INTERNAL MEDICINE

## 2020-04-29 PROCEDURE — 94761 N-INVAS EAR/PLS OXIMETRY MLT: CPT

## 2020-04-29 PROCEDURE — 2580000003 HC RX 258: Performed by: PHYSICIAN ASSISTANT

## 2020-04-29 PROCEDURE — 2700000000 HC OXYGEN THERAPY PER DAY

## 2020-04-29 PROCEDURE — 6360000002 HC RX W HCPCS: Performed by: NURSE PRACTITIONER

## 2020-04-29 PROCEDURE — 2580000003 HC RX 258: Performed by: NURSE PRACTITIONER

## 2020-04-29 PROCEDURE — 6370000000 HC RX 637 (ALT 250 FOR IP): Performed by: INTERNAL MEDICINE

## 2020-04-29 PROCEDURE — 94640 AIRWAY INHALATION TREATMENT: CPT

## 2020-04-29 RX ORDER — PREDNISONE 20 MG/1
TABLET ORAL
Qty: 20 TABLET | Refills: 0 | Status: SHIPPED | OUTPATIENT
Start: 2020-04-29 | End: 2020-05-14

## 2020-04-29 RX ADMIN — ENOXAPARIN SODIUM 40 MG: 40 INJECTION SUBCUTANEOUS at 09:40

## 2020-04-29 RX ADMIN — METHYLPREDNISOLONE SODIUM SUCCINATE 40 MG: 40 INJECTION, POWDER, LYOPHILIZED, FOR SOLUTION INTRAMUSCULAR; INTRAVENOUS at 10:21

## 2020-04-29 RX ADMIN — IPRATROPIUM BROMIDE AND ALBUTEROL SULFATE 1 AMPULE: .5; 3 SOLUTION RESPIRATORY (INHALATION) at 11:50

## 2020-04-29 RX ADMIN — METHYLPREDNISOLONE SODIUM SUCCINATE 40 MG: 40 INJECTION, POWDER, LYOPHILIZED, FOR SOLUTION INTRAMUSCULAR; INTRAVENOUS at 03:24

## 2020-04-29 RX ADMIN — SODIUM CHLORIDE, PRESERVATIVE FREE 10 ML: 5 INJECTION INTRAVENOUS at 09:40

## 2020-04-29 RX ADMIN — SODIUM CHLORIDE, PRESERVATIVE FREE 10 ML: 5 INJECTION INTRAVENOUS at 09:42

## 2020-04-29 RX ADMIN — IPRATROPIUM BROMIDE AND ALBUTEROL SULFATE 1 AMPULE: .5; 3 SOLUTION RESPIRATORY (INHALATION) at 08:10

## 2020-04-29 ASSESSMENT — PAIN SCALES - GENERAL
PAINLEVEL_OUTOF10: 0

## 2020-04-29 NOTE — DISCHARGE SUMMARY
opacities.  This is an indeterminate appearance with imaging   findings which can be seen with viral pneumonia, though nonspecific and can   occur in a variety of infectious and noninfectious processes including   pulmonary edema.                Discharge Time of 35 minutes    Electronically signed by Lillie Castro MD on 4/29/2020 at 11:32 AM

## 2020-04-29 NOTE — PROGRESS NOTES
4/29/2020 9:06 AM  Patient Room #: 8348/8869-F  Patient Name: Linda Waters    (Step 1 Done by RN if possible otherwise call Pulmonary Diagnostics)  1. Place patient on room air at rest for at least 30 minutes. If patient falls below 88% before 30 minutes then you can record the level and stop. Record room air saturation level _94_ %. If patient is at 88% or below, they will qualify for home oxygen and you can stop. If level does not fall below 88%, fill in level above. If indicated continue to Step 2. Signature:_Lori Rodrigez RN__ Date: _04/29/2020__  (Step 2&3 Done by [facility])  2. Ambulate patient on room air until saturation falls below 89%. Record level of room air saturation with ambulation_89 then right back up.__ %. Next, place patient back on ___lpm oxygen and ambulate, record level __%. (Note:  this level must show improvement from room air level done with ambulation.)  If patients saturation on room air with ambulation is 88% or below AND patient shows improvement with oxygen during ambulation, they will qualify for home oxygen and you can stop. If patient does not drop below 89%, then patient should have an overnight oximetry trending on room air to see if level falls below 88%. Complete level in Step 3 below. 3. Room air overnight oximetry level 88 % for___  cumulative minutes. If patients room air oxygen level is < 89% for at least 5 cumulative minutes, patient will qualify for home oxygen and you can stop. (Attach Night Trending Report)    Complete order below: Diagnosis:__Pneumonia____  Home oxygen at:  Length of Need: ? Lifetime ? X 3 Months     ___lpm or __%   via  [] nasal cannula  []mask  [] other:___         []continuous []  with activity  []  Nocturnal   [] Portable Tanks []  Concentrator        Therapist Signature:____________     Date:  _____  Physician Signature:  __Electronically Signed in EMR_    Date: ____    Physician Printed Name:  Arlene Roth
Consulted Dr. Parth Omer for pneumonia and worsening dyspnea.
Covid resulted NOT DETECTED. Perfect serve sent to Bridget Vasques NP for request for transfer order. Waiting for response at this time.
Follow-up apt made to see Dr. Shyann Freire for follow-up appointment. Pt stated she will make follow-up apt with her PCP since she is not sure which PCP she wants to see. She is aware that she needs to make a follow-up apt with PCP as soon as possible. Removed IV and went over discharge paperwork. No questions at this time. Pt discharged to home via private vehicle at this time.
Pt arrived to unit from ED. Pt ambulated into bed without issue. Cell phone, wallet, and keys are with pt at bedside. Pt alert and oriented. Skin assessment completed. No skin issues noted. Pt denies any needs at this. Call light in reach.
Received report from lab that patient is negative and checked under labs and patient is indeed negative on her 1st COVID-19 test. Sent a message to hospitalist with this update/assessment. Waiting for response.
pulmonary      SUBJECTIVE:  Get sob easily     OBJECTIVE    VITALS:  /62   Pulse 98   Temp 99 °F (37.2 °C) (Oral)   Resp 22   Ht 5' 1\" (1.549 m)   Wt 194 lb 3.6 oz (88.1 kg)   SpO2 91%   BMI 36.70 kg/m²   HEAD AND FACE EXAM:  No throat injection, no active exudate,no thrush  NECK EXAM;No JVD, no masses, symmetrical  CHEST EXAM; Expansion equal and symmetrical, no masses  LUNG EXAM; Good breath sounds bilaterally. There are expiratory wheezes both lungs, there are crackles at both lung bases  CARDIOVASCULAR EXAM: Positive S1 and S2, no S3 or S4, no clicks ,no murmurs  RIGHT AND LEFT LOWER EXTRIMITY EXAM: No edema, no swelling, no inflamation  CNS EXAM: Alert and oriented X3          LABS   Lab Results   Component Value Date    WBC 11.0 (H) 04/25/2020    HGB 13.9 04/25/2020    HCT 42.2 04/25/2020    .5 (H) 04/25/2020     04/25/2020     Lab Results   Component Value Date    CREATININE 0.4 (L) 04/26/2020    BUN 2 (L) 04/25/2020     (L) 04/25/2020    K 3.8 04/25/2020     04/25/2020    CO2 21 04/25/2020     No results found for: INR, PROTIME     No results found for: PHOS   No results for input(s): PH, PO2ART, KHY3XGE, HCO3, BEART, O2SAT in the last 72 hours. Wt Readings from Last 3 Encounters:   04/26/20 194 lb 3.6 oz (88.1 kg)               ASSESMENT  bridget pneumonia  R/o covid 19        PLAN  1. cpm  2.  Cont o2    4/26/2020  Hannah Bustillos M.D.
pulmonary      SUBJECTIVE: she still with sob and on o2     OBJECTIVE    VITALS:  /65   Pulse 89   Temp 98 °F (36.7 °C) (Oral)   Resp 16   Ht 5' 1\" (1.549 m)   Wt 192 lb 14.4 oz (87.5 kg)   SpO2 94%   BMI 36.45 kg/m²   HEAD AND FACE EXAM:  No throat injection, no active exudate,no thrush  NECK EXAM;No JVD, no masses, symmetrical  CHEST EXAM; Expansion equal and symmetrical, no masses  LUNG EXAM; Good breath sounds bilaterally. There are expiratory wheezes both lungs, there are crackles at both lung bases  CARDIOVASCULAR EXAM: Positive S1 and S2, no S3 or S4, no clicks ,no murmurs  RIGHT AND LEFT LOWER EXTRIMITY EXAM: No edema, no swelling, no inflamation  CNS EXAM: Alert and oriented X3          LABS   Lab Results   Component Value Date    WBC 7.7 04/27/2020    HGB 13.2 04/27/2020    HCT 40.4 04/27/2020    MCV 98.8 04/27/2020     04/27/2020     Lab Results   Component Value Date    CREATININE 0.5 (L) 04/27/2020    BUN 2 (L) 04/27/2020     04/27/2020    K 4.5 04/27/2020     04/27/2020    CO2 25 04/27/2020     No results found for: INR, PROTIME     No results found for: PHOS   No results for input(s): PH, PO2ART, DPP9YVR, HCO3, BEART, O2SAT in the last 72 hours. Wt Readings from Last 3 Encounters:   04/27/20 192 lb 14.4 oz (87.5 kg)               ASSESMENT  bridget pneumonia  Ac bronchospasm        PLAN  1. cpm  2. Check cxr again  3. Bd rx  4.  One dose iv steroids    4/28/2020  Kimberly Kurtz M.D.
pulmonary      SUBJECTIVE: stable     OBJECTIVE    VITALS:  /74   Pulse 99   Temp 98.8 °F (37.1 °C) (Oral)   Resp 30   Ht 5' 1\" (1.549 m)   Wt 192 lb 14.4 oz (87.5 kg)   SpO2 95%   BMI 36.45 kg/m²   HEAD AND FACE EXAM:  No throat injection, no active exudate,no thrush  NECK EXAM;No JVD, no masses, symmetrical  CHEST EXAM; Expansion equal and symmetrical, no masses  LUNG EXAM; Good breath sounds bilaterally. There are expiratory wheezes both lungs, there are crackles at both lung bases  CARDIOVASCULAR EXAM: Positive S1 and S2, no S3 or S4, no clicks ,no murmurs  RIGHT AND LEFT LOWER EXTRIMITY EXAM: No edema, no swelling, no inflamation  CNS EXAM: Alert and oriented X3          LABS   Lab Results   Component Value Date    WBC 11.0 (H) 04/25/2020    HGB 13.9 04/25/2020    HCT 42.2 04/25/2020    .5 (H) 04/25/2020     04/25/2020     Lab Results   Component Value Date    CREATININE 0.4 (L) 04/26/2020    BUN 2 (L) 04/25/2020     (L) 04/25/2020    K 3.8 04/25/2020     04/25/2020    CO2 21 04/25/2020     No results found for: INR, PROTIME     No results found for: PHOS   No results for input(s): PH, PO2ART, YXB0MIV, HCO3, BEART, O2SAT in the last 72 hours. Wt Readings from Last 3 Encounters:   04/27/20 192 lb 14.4 oz (87.5 kg)               ASSESMENT  bridget pneumonia  covid 19 disease negative        PLAN  1. cpm  2.  Slowly improving clinical status    4/27/2020  Kasey Mora M.D.
*    Blood pressure 112/74, pulse 99, temperature 98.8 °F (37.1 °C), temperature source Oral, resp. rate 30, height 5' 1\" (1.549 m), weight 192 lb 14.4 oz (87.5 kg), SpO2 95 %. Subjective:  Diet:  Poor intake. Pain:  She complains of pain that is mild. Objective:  General Appearance:  Uncomfortable. Vital signs: (most recent): Blood pressure 112/74, pulse 99, temperature 98.8 °F (37.1 °C), temperature source Oral, resp. rate 30, height 5' 1\" (1.549 m), weight 192 lb 14.4 oz (87.5 kg), SpO2 95 %. (Tachy and tachypnea). HEENT: Normal HEENT exam.    Lungs:  Increased effort. There are decreased breath sounds. Heart: Tachycardia. Abdomen: Abdomen is soft. Extremities: Decreased range of motion. Neurological: Patient is alert. Pupils:  Pupils are equal, round, and reactive to light. Skin:  Warm. Assessment & Plan  Sepsis (POA)Cough with fever with acute resp failure with hypoxia and suspected gram neg pna  -CT PE with bilateral ground glass opacities  -bld cx pending, CRP 111procalcitonin 0.063, DDimer 328  -resp PCR neg, legionella and strep neg  -bld cx  -Ferritin 414,    -COVID 19 neg  -Levofloxacin, added zoysn and vanc   DVT prohylaxis  -lovenox    She was on 5L this morning as she had a \"coughing fit\". Discussed with nursing that should be stable for med floor if oxygen requirement is less than 5L. Will add steroids as COVID neg and still has dyspnea.    Fred Mccormick MD  4/27/2020

## 2020-05-05 LAB
EKG ATRIAL RATE: 110 BPM
EKG DIAGNOSIS: NORMAL
EKG P AXIS: 57 DEGREES
EKG P-R INTERVAL: 170 MS
EKG Q-T INTERVAL: 344 MS
EKG QRS DURATION: 82 MS
EKG QTC CALCULATION (BAZETT): 465 MS
EKG R AXIS: 51 DEGREES
EKG T AXIS: 20 DEGREES
EKG VENTRICULAR RATE: 110 BPM

## 2020-05-24 PROBLEM — R05.9 COUGH: Status: RESOLVED | Noted: 2020-04-24 | Resolved: 2020-05-24

## 2021-10-08 ENCOUNTER — APPOINTMENT (OUTPATIENT)
Dept: GENERAL RADIOLOGY | Age: 41
End: 2021-10-08
Payer: COMMERCIAL

## 2021-10-08 ENCOUNTER — HOSPITAL ENCOUNTER (EMERGENCY)
Age: 41
Discharge: HOME | End: 2021-10-08
Attending: EMERGENCY MEDICINE
Payer: COMMERCIAL

## 2021-10-08 VITALS
OXYGEN SATURATION: 98 % | TEMPERATURE: 98.2 F | SYSTOLIC BLOOD PRESSURE: 132 MMHG | HEART RATE: 87 BPM | BODY MASS INDEX: 32.1 KG/M2 | WEIGHT: 170 LBS | RESPIRATION RATE: 18 BRPM | HEIGHT: 61 IN | DIASTOLIC BLOOD PRESSURE: 99 MMHG

## 2021-10-08 DIAGNOSIS — S80.11XA CONTUSION OF MULTIPLE SITES OF RIGHT LOWER EXTREMITY, INITIAL ENCOUNTER: Primary | ICD-10-CM

## 2021-10-08 PROCEDURE — 99283 EMERGENCY DEPT VISIT LOW MDM: CPT

## 2021-10-08 PROCEDURE — 73590 X-RAY EXAM OF LOWER LEG: CPT

## 2021-10-08 PROCEDURE — 73610 X-RAY EXAM OF ANKLE: CPT

## 2021-10-08 PROCEDURE — 99282 EMERGENCY DEPT VISIT SF MDM: CPT

## 2021-10-08 RX ORDER — METHOCARBAMOL 500 MG/1
500 TABLET, FILM COATED ORAL 4 TIMES DAILY PRN
Qty: 20 TABLET | Refills: 0 | Status: SHIPPED | OUTPATIENT
Start: 2021-10-08 | End: 2021-10-18

## 2021-10-08 RX ORDER — IBUPROFEN 600 MG/1
600 TABLET ORAL 3 TIMES DAILY PRN
Qty: 30 TABLET | Refills: 0 | Status: SHIPPED | OUTPATIENT
Start: 2021-10-08

## 2021-10-08 ASSESSMENT — PAIN SCALES - GENERAL: PAINLEVEL_OUTOF10: 4

## 2021-10-08 NOTE — ED NOTES
Pt present to ED with c/o right leg pain from a fall. Pt states she was stepping over a baby gate last night and got her leg stuck. Pt states she has not been able to bare weight.      Goran Ross RN  10/08/21 [id number]

## 2021-10-08 NOTE — Clinical Note
Renetta Mc was seen and treated in our emergency department on 10/8/2021. She may return to work on 10/10/2021. If you have any questions or concerns, please don't hesitate to call.       Johnie Allison MD

## 2021-10-08 NOTE — ED PROVIDER NOTES
CHIEF COMPLAINT  Chief Complaint   Patient presents with    Leg Pain     right    Leg Injury       HPI  Tona Liu is a 46 y.o. female with history of anxiety, thyroid disease, relative previous health, no blood thinners who presents pain at the right lateral thigh after an injury where she tripped over a dog gate that happened around 9 PM last night. She said difficulty ambulating since that time secondary to pain at the lateral calf. No popliteal fullness, associated pain at the lateral malleolus. No tenderness at the base of the first or the fifth metatarsal.  No numbness of the foot. Pain is aching, throbbing, persistent until time evaluation here and she has been using crutches with difficulty ambulating secondary to discomfort. She denies any pain at the knee or at the hip. She denies any trauma to the head or neck. She denies other concerns. REVIEW OF SYSTEMS  Review of Systems   History obtained from chart review and the patient  General ROS: negative for - chills or fever  Ophthalmic ROS: negative for - decreased vision or double vision  ENT ROS: negative for - headaches  Hematological and Lymphatic ROS: negative for - bleeding problems, blood clots or bruising  Endocrine ROS: negative for - unexpected weight changes  Respiratory ROS: no cough, shortness of breath, or wheezing  Cardiovascular ROS: no chest pain or dyspnea on exertion  Gastrointestinal ROS: no abdominal pain, change in bowel habits, or black or bloody stools  Genito-Urinary ROS: no dysuria, trouble voiding, or hematuria  Musculoskeletal ROS: positive for -right leg pain  Neurological ROS: negative for - numbness/tingling or weakness      PAST MEDICAL HISTORY  Past Medical History:   Diagnosis Date    Anxiety     Thyroid disease        FAMILY HISTORY  No family history on file.     SOCIAL HISTORY  Social History     Socioeconomic History    Marital status: Single     Spouse name: Not on file    Number of children: Not on file    Years of education: Not on file    Highest education level: Not on file   Occupational History    Not on file   Tobacco Use    Smoking status: Current Every Day Smoker     Packs/day: 0.50     Types: Cigarettes    Smokeless tobacco: Never Used   Vaping Use    Vaping Use: Never used   Substance and Sexual Activity    Alcohol use: Yes     Comment: social    Drug use: No    Sexual activity: Not on file   Other Topics Concern    Not on file   Social History Narrative    Not on file     Social Determinants of Health     Financial Resource Strain:     Difficulty of Paying Living Expenses:    Food Insecurity:     Worried About Running Out of Food in the Last Year:     Ran Out of Food in the Last Year:    Transportation Needs:     Lack of Transportation (Medical):  Lack of Transportation (Non-Medical):    Physical Activity:     Days of Exercise per Week:     Minutes of Exercise per Session:    Stress:     Feeling of Stress :    Social Connections:     Frequency of Communication with Friends and Family:     Frequency of Social Gatherings with Friends and Family:     Attends Hoahaoism Services:     Active Member of Clubs or Organizations:     Attends Club or Organization Meetings:     Marital Status:    Intimate Partner Violence:     Fear of Current or Ex-Partner:     Emotionally Abused:     Physically Abused:     Sexually Abused:        SURGICAL HISTORY  Past Surgical History:   Procedure Laterality Date    CHOLECYSTECTOMY      CYST REMOVAL      MYRINGOTOMY AND TYMPANOSTOMY TUBE PLACEMENT      TONSILLECTOMY AND ADENOIDECTOMY     [address]  No current facility-administered medications on file prior to encounter. No current outpatient medications on file prior to encounter.          ALLERGIES  Allergies   Allergen Reactions    Bactrim [Sulfamethoxazole-Trimethoprim] Rash    Keflex [Cephalexin] Rash       PHYSICAL EXAM  VITAL SIGNS: BP (!) 132/99   Pulse 87   Temp 98.2 °F (36.8 °C) (Infrared)   Resp 18   Ht 5' 1\" (1.549 m)   Wt 170 lb (77.1 kg)   SpO2 98%   BMI 32.12 kg/m²   Constitutional: Well developed, Well nourished, resting in bed, active, pleasant  HENT: Normocephalic, Atraumatic, Bilateral external ears normal, mask in place per recommendations  Eyes:  EOMI, Conjunctiva normal, No discharge. Neck: Normal range of motion, Supple, No stridor. Cardiovascular: Normal heart rate, Normal rhythm, No murmurs, No rubs, No gallops. Thorax & Lungs: Normal breath sounds, No respiratory distress, No wheezing, No chest tenderness. Abdomen: Bowel sounds normal, Soft, No tenderness, no guarding, no rebound, No masses, No pulsatile masses. Skin: Warm, Dry, No erythema, No rash. Extremities: Intact distal pulses, No edema, No tenderness, No cyanosis, No clubbing. Lower extremity: No laxity on anterior posterior drawer testing of the knee, no laxity on valgus or varus strain or on palpation of the knee. Popliteal space intact without any palpable edema or abnormality. Tenderness overlying the lateral calf. Razo test normal.  Normal DP pulse. No tenderness on palpation of the foot. Mild tenderness at the lateral malleolus. Musculoskeletal: Good gross range of motion in all major joints. No major deformities noted. Neurologic: Alert & oriented x 3, Normal gross motor function, Normal gross sensory function, No focal deficits noted. Psychiatric: Affect normal      RADIOLOGY/PROCEDURES/LABS  Last Imaging results   XR TIBIA FIBULA RIGHT (2 VIEWS)   Preliminary Result   1. Mild lateral malleolar soft tissue swelling but no acute fractures or   dislocations of the right ankle. 2. No acute tibia or fibular abnormality. XR ANKLE RIGHT (MIN 3 VIEWS)   Preliminary Result   1. Mild lateral malleolar soft tissue swelling but no acute fractures or   dislocations of the right ankle.    2. No acute tibia or fibular abnormality. Imaging reviewed by myself    Offered medications and defers, ice applied  COURSE & MEDICAL DECISION MAKING  Pertinent Labs & Imaging studies reviewed. (See chart for details)    68-year-old female presents with contusion to the right lower extremity, no signs of neurovascular compromise, imaging without clear fracture or dislocation. She has crutches at home, she will be treated symptomatically for musculoskeletal contusion. Not suggestion of compartment syndrome at this time. Discharged in stable condition, strict return precautions put in to place. Ace wrap applied for comfort. FINAL IMPRESSION  Problem List Items Addressed This Visit     None      Visit Diagnoses     Contusion of multiple sites of right lower extremity, initial encounter    -  Primary      1.    2.   3.    Patient gave me permission to discuss medical history, care, and plan with those present in the room.   Electronically signed by: Caro Kimble MD, 10/8/2021  MD Caro Mathew MD  10/08/21 9705

## 2021-10-13 ENCOUNTER — OFFICE VISIT (OUTPATIENT)
Dept: FAMILY MEDICINE CLINIC | Age: 41
End: 2021-10-13
Payer: COMMERCIAL

## 2021-10-13 VITALS
OXYGEN SATURATION: 99 % | SYSTOLIC BLOOD PRESSURE: 140 MMHG | TEMPERATURE: 97.2 F | DIASTOLIC BLOOD PRESSURE: 92 MMHG | BODY MASS INDEX: 32.91 KG/M2 | HEART RATE: 98 BPM | WEIGHT: 174.2 LBS

## 2021-10-13 DIAGNOSIS — S80.11XD CONTUSION OF MULTIPLE SITES OF RIGHT LOWER EXTREMITY, SUBSEQUENT ENCOUNTER: Primary | ICD-10-CM

## 2021-10-13 PROCEDURE — 99202 OFFICE O/P NEW SF 15 MIN: CPT | Performed by: NURSE PRACTITIONER

## 2021-10-13 NOTE — PROGRESS NOTES
Robert Khan   46 y.o.  female  [id number]      Chief Complaint   Patient presents with   Marilia Pereyra ED Follow-up     right leg contusion        Subjective:  46 y. o.female is here for a follow up. She has the following chronic/acute medical problems:  Patient Active Problem List   Diagnosis   (none) - all problems resolved or deleted       Patient is here for a follow up from the emergency room on 10/8. Patient has tripped over a dog gate and had went to the ER for a right lateral calf and right ankle injury. Patient had xray of right tibia/fibula - mild lateral malleolar soft tissue swelling but no acute fractures or dislocation of the right ankle. No acute tibia or fibular abnormality. Patient states the area is doing a lot better. Patient states she is not using crutches. Patient states she is still struggling a bit with ambulation due to pain of the right lateral calf area. Review of Systems   Constitutional: Negative for appetite change, chills, fatigue and fever. HENT: Negative for congestion, ear pain, postnasal drip, rhinorrhea, sinus pressure, sinus pain, sneezing and sore throat. Respiratory: Negative for cough, chest tightness, shortness of breath and wheezing. Cardiovascular: Negative for chest pain and palpitations. Gastrointestinal: Negative for diarrhea, nausea and vomiting. Skin: Negative for rash. Neurological: Negative for dizziness, light-headedness and headaches. Current Outpatient Medications   Medication Sig Dispense Refill    ibuprofen (ADVIL;MOTRIN) 600 MG tablet Take 1 tablet by mouth 3 times daily as needed for Pain 30 tablet 0     No current facility-administered medications for this visit. Past medical, family,surgical history reviewed today.      Objective:  BP (!) 140/92   Pulse 98   Temp 97.2 °F (36.2 °C) (Infrared)   Wt 174 lb 3.2 oz (79 kg)   SpO2 99%   BMI 32.91 kg/m²   BP Readings from Last 3 Encounters:   10/20/21 130/84   10/13/21 (!) 140/92   10/08/21 (!) 132/99     Wt Readings from Last 3 Encounters:   10/20/21 176 lb (79.8 kg)   10/13/21 174 lb 3.2 oz (79 kg)   10/08/21 170 lb (77.1 kg)         Physical Exam  Constitutional:       Appearance: Normal appearance. HENT:      Head: Normocephalic. Cardiovascular:      Rate and Rhythm: Normal rate and regular rhythm. Heart sounds: Normal heart sounds. Pulmonary:      Effort: Pulmonary effort is normal.      Breath sounds: Normal breath sounds. Musculoskeletal:      Cervical back: Neck supple. Skin:     General: Skin is warm and dry. Comments: Tenderness over the right lateral calf area. No edema. Intact distal pulses. Normal DP pulse. Tenderness at the lateral malleolus. Neurological:      Mental Status: She is alert and oriented to person, place, and time. Psychiatric:         Mood and Affect: Mood normal.         Behavior: Behavior normal.         Lab Results   Component Value Date    WBC 7.7 04/27/2020    HGB 13.2 04/27/2020    HCT 40.4 04/27/2020    MCV 98.8 04/27/2020     04/27/2020     Lab Results   Component Value Date     04/27/2020    K 4.5 04/27/2020     04/27/2020    CO2 25 04/27/2020    BUN 2 (L) 04/27/2020    CREATININE 0.5 (L) 04/28/2020    GLUCOSE 80 04/27/2020    CALCIUM 9.0 04/27/2020    PROT 6.9 04/24/2020    LABALBU 4.0 04/24/2020    BILITOT 0.7 04/24/2020    ALKPHOS 74 04/24/2020    AST 14 (L) 04/24/2020    ALT 13 04/24/2020    LABGLOM >60 04/28/2020    GFRAA >60 04/28/2020     No results found for: CHOL  No results found for: TRIG  No results found for: HDL  No results found for: LDLCALC, LDLCHOLESTEROL  No results found for: LABA1C  No results found for: TSHFT4, TSH, TSHHS      ASSESSMENT/PLAN:      1. Contusion of multiple sites of right lower extremity, subsequent encounter  RICE. Ibuprofen 600 mg Q 6 as needed for pain. May ace wrap the area - has one at home. Use crutches if needed. Letter given to patient for work restrictions.  Follow up in 1 week.           No orders of the defined types were placed in this encounter. There are no discontinued medications. No orders of the defined types were placed in this encounter. Care discussed with patient. Questions answered. Patient verbalizes understanding and agrees with plan. After visit summary provided. Advised to call for any problems, questions, or concerns. Return in about 1 week (around 10/20/2021).                                              Signed:  MARS Carcamo CNP  10/22/21  4:56 PM

## 2021-10-19 ENCOUNTER — TELEPHONE (OUTPATIENT)
Dept: FAMILY MEDICINE CLINIC | Age: 41
End: 2021-10-19

## 2021-10-19 NOTE — TELEPHONE ENCOUNTER
Tried to call patient about FMLA paperwork being ready for pickup but no voicemail available to leave message. Patient has appoinment on 10/20/2021 @3pm and can get paperwork then if she does not call back.

## 2021-10-20 ENCOUNTER — HOSPITAL ENCOUNTER (OUTPATIENT)
Age: 41
Discharge: HOME | End: 2021-10-20
Payer: COMMERCIAL

## 2021-10-20 ENCOUNTER — HOSPITAL ENCOUNTER (OUTPATIENT)
Dept: GENERAL RADIOLOGY | Age: 41
Discharge: HOME | End: 2021-10-20
Payer: COMMERCIAL

## 2021-10-20 ENCOUNTER — OFFICE VISIT (OUTPATIENT)
Dept: FAMILY MEDICINE CLINIC | Age: 41
End: 2021-10-20
Payer: COMMERCIAL

## 2021-10-20 VITALS
BODY MASS INDEX: 33.23 KG/M2 | OXYGEN SATURATION: 98 % | DIASTOLIC BLOOD PRESSURE: 84 MMHG | HEART RATE: 108 BPM | WEIGHT: 176 LBS | HEIGHT: 61 IN | SYSTOLIC BLOOD PRESSURE: 130 MMHG

## 2021-10-20 DIAGNOSIS — M25.471 RIGHT ANKLE SWELLING: ICD-10-CM

## 2021-10-20 DIAGNOSIS — S80.11XD CONTUSION OF MULTIPLE SITES OF RIGHT LOWER EXTREMITY, SUBSEQUENT ENCOUNTER: Primary | ICD-10-CM

## 2021-10-20 DIAGNOSIS — M25.571 ACUTE RIGHT ANKLE PAIN: ICD-10-CM

## 2021-10-20 PROCEDURE — 73610 X-RAY EXAM OF ANKLE: CPT

## 2021-10-20 PROCEDURE — 99213 OFFICE O/P EST LOW 20 MIN: CPT | Performed by: NURSE PRACTITIONER

## 2021-10-20 NOTE — PROGRESS NOTES
Bernestine Days   46 y.o.  female  [id number]      Chief Complaint   Patient presents with    Other     1 week f/u Right leg contusion        Subjective:  46 y. o.female is here for a follow up. She has the following chronic/acute medical problems:  Patient Active Problem List   Diagnosis   (none) - all problems resolved or deleted       Patient is here for a follow up on right leg contusion on her lateral calf area and right ankle. Patient has been on light duty for the last week - sitting with occasional standing/walking. Patient states the area is improving and knows it will take time. Patient states she would like to be put back to work with no restrictions. Review of Systems   Constitutional: Negative for appetite change, chills, fatigue and fever. HENT: Negative for congestion, ear pain, postnasal drip, rhinorrhea, sinus pressure, sinus pain, sneezing and sore throat. Respiratory: Negative for cough, chest tightness, shortness of breath and wheezing. Cardiovascular: Negative for chest pain and palpitations. Gastrointestinal: Negative for diarrhea, nausea and vomiting. Skin: Negative for rash. Neurological: Negative for dizziness, light-headedness and headaches. Current Outpatient Medications   Medication Sig Dispense Refill    ibuprofen (ADVIL;MOTRIN) 600 MG tablet Take 1 tablet by mouth 3 times daily as needed for Pain 30 tablet 0     No current facility-administered medications for this visit. Past medical, family,surgical history reviewed today. Objective:  /84   Pulse 108   Ht 5' 1\" (1.549 m)   Wt 176 lb (79.8 kg)   SpO2 98%   BMI 33.25 kg/m²   BP Readings from Last 3 Encounters:   10/20/21 130/84   10/13/21 (!) 140/92   10/08/21 (!) 132/99     Wt Readings from Last 3 Encounters:   10/20/21 176 lb (79.8 kg)   10/13/21 174 lb 3.2 oz (79 kg)   10/08/21 170 lb (77.1 kg)         Physical Exam  Constitutional:       Appearance: Normal appearance.    HENT:      Head: Normocephalic. Cardiovascular:      Rate and Rhythm: Normal rate and regular rhythm. Heart sounds: Normal heart sounds. Pulmonary:      Effort: Pulmonary effort is normal.      Breath sounds: Normal breath sounds. Musculoskeletal:      Cervical back: Neck supple. Skin:     General: Skin is warm and dry. Comments: Tenderness over the right lateral calf area. No edema. Intact distal pulses. Normal DP pulse. Tenderness at the lateral malleolus. Neurological:      Mental Status: She is alert and oriented to person, place, and time. Psychiatric:         Mood and Affect: Mood normal.         Behavior: Behavior normal.         Lab Results   Component Value Date    WBC 7.7 04/27/2020    HGB 13.2 04/27/2020    HCT 40.4 04/27/2020    MCV 98.8 04/27/2020     04/27/2020     Lab Results   Component Value Date     04/27/2020    K 4.5 04/27/2020     04/27/2020    CO2 25 04/27/2020    BUN 2 (L) 04/27/2020    CREATININE 0.5 (L) 04/28/2020    GLUCOSE 80 04/27/2020    CALCIUM 9.0 04/27/2020    PROT 6.9 04/24/2020    LABALBU 4.0 04/24/2020    BILITOT 0.7 04/24/2020    ALKPHOS 74 04/24/2020    AST 14 (L) 04/24/2020    ALT 13 04/24/2020    LABGLOM >60 04/28/2020    GFRAA >60 04/28/2020     No results found for: CHOL  No results found for: TRIG  No results found for: HDL  No results found for: LDLCALC, LDLCHOLESTEROL  No results found for: LABA1C  No results found for: TSHFT4, TSH, TSHHS      ASSESSMENT/PLAN:      1. Contusion of multiple sites of right lower extremity, subsequent encounter  Explained to patient sometimes a fracture cannot be seen on the initial xray - since patient continues with slight swelling and pain will recheck xray of right ankle. If right xray of ankle does not show fracture will release patient to full duty since she believes she is ready. - XR ANKLE RIGHT (MIN 3 VIEWS); Future        No orders of the defined types were placed in this encounter.     There are no discontinued medications. Care discussed with patient. Questions answered. Patient verbalizes understanding and agrees with plan. After visit summary provided. Advised to call for any problems, questions, or concerns. Return if symptoms worsen or fail to improve.                                              Signed:  MARS Matute CNP  10/22/21  6:36 PM

## 2021-10-22 ASSESSMENT — ENCOUNTER SYMPTOMS
WHEEZING: 0
NAUSEA: 0
CHEST TIGHTNESS: 0
SHORTNESS OF BREATH: 0
SINUS PAIN: 0
WHEEZING: 0
SINUS PRESSURE: 0
COUGH: 0
RHINORRHEA: 0
COUGH: 0
SINUS PAIN: 0
CHEST TIGHTNESS: 0
NAUSEA: 0
DIARRHEA: 0
SORE THROAT: 0
SINUS PRESSURE: 0
VOMITING: 0
SHORTNESS OF BREATH: 0
DIARRHEA: 0
VOMITING: 0
RHINORRHEA: 0
SORE THROAT: 0

## 2023-04-18 ENCOUNTER — HOSPITAL ENCOUNTER (OUTPATIENT)
Dept: ULTRASOUND IMAGING | Age: 43
Discharge: HOME | End: 2023-04-18
Payer: COMMERCIAL

## 2023-04-18 DIAGNOSIS — R10.11 RUQ PAIN: ICD-10-CM

## 2023-04-18 PROCEDURE — 76705 ECHO EXAM OF ABDOMEN: CPT

## 2024-11-20 ENCOUNTER — HOSPITAL ENCOUNTER (EMERGENCY)
Age: 44
Discharge: HOME | End: 2024-11-20
Payer: COMMERCIAL

## 2024-11-20 ENCOUNTER — APPOINTMENT (OUTPATIENT)
Dept: GENERAL RADIOLOGY | Age: 44
End: 2024-11-20
Payer: COMMERCIAL

## 2024-11-20 VITALS
BODY MASS INDEX: 33.04 KG/M2 | WEIGHT: 175 LBS | SYSTOLIC BLOOD PRESSURE: 183 MMHG | DIASTOLIC BLOOD PRESSURE: 95 MMHG | HEART RATE: 80 BPM | RESPIRATION RATE: 18 BRPM | TEMPERATURE: 97.6 F | OXYGEN SATURATION: 95 % | HEIGHT: 61 IN

## 2024-11-20 DIAGNOSIS — R07.89 CHEST DISCOMFORT: ICD-10-CM

## 2024-11-20 DIAGNOSIS — R79.89 ELEVATED LIVER FUNCTION TESTS: Primary | ICD-10-CM

## 2024-11-20 DIAGNOSIS — R06.02 SHORTNESS OF BREATH: ICD-10-CM

## 2024-11-20 LAB
ALBUMIN SERPL-MCNC: 4.4 G/DL (ref 3.4–5)
ALBUMIN/GLOB SERPL: 1.7 {RATIO} (ref 1.1–2.2)
ALP SERPL-CCNC: 102 U/L (ref 40–129)
ALT SERPL-CCNC: 53 U/L (ref 10–40)
ANION GAP SERPL CALCULATED.3IONS-SCNC: 14 MMOL/L (ref 9–17)
AST SERPL-CCNC: 70 U/L (ref 15–37)
BASOPHILS # BLD: 0.04 K/UL
BASOPHILS NFR BLD: 1 % (ref 0–1)
BILIRUB SERPL-MCNC: 0.9 MG/DL (ref 0–1)
BUN SERPL-MCNC: 5 MG/DL (ref 7–20)
CALCIUM SERPL-MCNC: 9.1 MG/DL (ref 8.3–10.6)
CHLORIDE SERPL-SCNC: 101 MMOL/L (ref 99–110)
CO2 SERPL-SCNC: 22 MMOL/L (ref 21–32)
CREAT SERPL-MCNC: 0.5 MG/DL (ref 0.6–1.1)
D DIMER PPP FEU-MCNC: <0.27 UG/ML FEU (ref 0–0.46)
EKG ATRIAL RATE: 85 BPM
EKG DIAGNOSIS: NORMAL
EKG P AXIS: 52 DEGREES
EKG P-R INTERVAL: 128 MS
EKG Q-T INTERVAL: 398 MS
EKG QRS DURATION: 84 MS
EKG QTC CALCULATION (BAZETT): 473 MS
EKG R AXIS: 17 DEGREES
EKG T AXIS: 36 DEGREES
EKG VENTRICULAR RATE: 85 BPM
EOSINOPHIL # BLD: 0.04 K/UL
EOSINOPHILS RELATIVE PERCENT: 1 % (ref 0–3)
ERYTHROCYTE [DISTWIDTH] IN BLOOD BY AUTOMATED COUNT: 13.2 % (ref 11.7–14.9)
GFR, ESTIMATED: >90 ML/MIN/1.73M2
GLUCOSE SERPL-MCNC: 98 MG/DL (ref 74–99)
HCT VFR BLD AUTO: 51.6 % (ref 37–47)
HGB BLD-MCNC: 18 G/DL (ref 12.5–16)
IMM GRANULOCYTES # BLD AUTO: 0.03 K/UL
IMM GRANULOCYTES NFR BLD: 0 %
LYMPHOCYTES NFR BLD: 2.63 K/UL
LYMPHOCYTES RELATIVE PERCENT: 35 % (ref 24–44)
MCH RBC QN AUTO: 34 PG (ref 27–31)
MCHC RBC AUTO-ENTMCNC: 34.9 G/DL (ref 32–36)
MCV RBC AUTO: 97.4 FL (ref 78–100)
MONOCYTES NFR BLD: 0.57 K/UL
MONOCYTES NFR BLD: 8 % (ref 0–4)
NEUTROPHILS NFR BLD: 56 % (ref 36–66)
NEUTS SEG NFR BLD: 4.13 K/UL
PLATELET # BLD AUTO: 200 K/UL (ref 140–440)
PMV BLD AUTO: 10.4 FL (ref 7.5–11.1)
POTASSIUM SERPL-SCNC: 3.8 MMOL/L (ref 3.5–5.1)
PROT SERPL-MCNC: 7 G/DL (ref 6.4–8.2)
RBC # BLD AUTO: 5.3 M/UL (ref 4.2–5.4)
SODIUM SERPL-SCNC: 137 MMOL/L (ref 136–145)
TROPONIN I SERPL HS-MCNC: <6 NG/L (ref 0–14)
TROPONIN I SERPL HS-MCNC: <6 NG/L (ref 0–14)
WBC OTHER # BLD: 7.4 K/UL (ref 4–10.5)

## 2024-11-20 PROCEDURE — 93010 ELECTROCARDIOGRAM REPORT: CPT | Performed by: INTERNAL MEDICINE

## 2024-11-20 PROCEDURE — 6360000002 HC RX W HCPCS: Performed by: NURSE PRACTITIONER

## 2024-11-20 PROCEDURE — 71046 X-RAY EXAM CHEST 2 VIEWS: CPT

## 2024-11-20 PROCEDURE — 80053 COMPREHEN METABOLIC PANEL: CPT

## 2024-11-20 PROCEDURE — 99285 EMERGENCY DEPT VISIT HI MDM: CPT

## 2024-11-20 PROCEDURE — 85025 COMPLETE CBC W/AUTO DIFF WBC: CPT

## 2024-11-20 PROCEDURE — 84484 ASSAY OF TROPONIN QUANT: CPT

## 2024-11-20 PROCEDURE — 85379 FIBRIN DEGRADATION QUANT: CPT

## 2024-11-20 PROCEDURE — 96374 THER/PROPH/DIAG INJ IV PUSH: CPT

## 2024-11-20 PROCEDURE — 93005 ELECTROCARDIOGRAM TRACING: CPT | Performed by: NURSE PRACTITIONER

## 2024-11-20 RX ORDER — KETOROLAC TROMETHAMINE 15 MG/ML
30 INJECTION, SOLUTION INTRAMUSCULAR; INTRAVENOUS ONCE
Status: COMPLETED | OUTPATIENT
Start: 2024-11-20 | End: 2024-11-20

## 2024-11-20 RX ADMIN — KETOROLAC TROMETHAMINE 30 MG: 15 INJECTION, SOLUTION INTRAMUSCULAR; INTRAVENOUS at 13:23

## 2024-11-20 ASSESSMENT — ENCOUNTER SYMPTOMS
VOMITING: 0
SHORTNESS OF BREATH: 1
COUGH: 1
NAUSEA: 0
ABDOMINAL DISTENTION: 0
CHEST TIGHTNESS: 1
ABDOMINAL PAIN: 0

## 2024-11-20 ASSESSMENT — PAIN SCALES - GENERAL
PAINLEVEL_OUTOF10: 0
PAINLEVEL_OUTOF10: 0

## 2024-11-20 ASSESSMENT — LIFESTYLE VARIABLES
HOW OFTEN DO YOU HAVE A DRINK CONTAINING ALCOHOL: NEVER
HOW MANY STANDARD DRINKS CONTAINING ALCOHOL DO YOU HAVE ON A TYPICAL DAY: PATIENT DOES NOT DRINK

## 2024-11-20 ASSESSMENT — PAIN - FUNCTIONAL ASSESSMENT: PAIN_FUNCTIONAL_ASSESSMENT: 0-10

## 2024-11-20 NOTE — ED PROVIDER NOTES
EKG interpreted by me  Normal sinus rhythm with rate of 85 bpm, normal intervals.  No ST elevation.  Normal EKG.  No previous to compare     Gabriela Palacios MD  11/20/24 0782    
understanding.    CONSULTS: (Who and What was discussed)  None            Disposition Considerations (include 1 Tests not done, Shared Decision Making, Pt Expectation of Test or Tx.): Appropriate for outpatient management. Shared decision making ensued. Patient/family comfortable with discharge. Strict ED return guidelines reviewed.  Pt discharged in stable condition with appropriate follow up.      FINAL IMPRESSION      1. Elevated liver function tests    2. Chest discomfort    3. Shortness of breath          DISPOSITION/PLAN     DISPOSITION Decision To Discharge 11/20/2024 03:20:20 PM     PATIENT REFERRED TO:  Aurelia Tsai PA-C  [address] [facility] [address]  [phone]    Schedule an appointment as soon as possible for a visit in 3 days        DISCHARGE MEDICATIONS:  Discharge Medication List as of 11/20/2024  3:22 PM          DISCONTINUED MEDICATIONS:  Discharge Medication List as of 11/20/2024  3:22 PM               (Please note that portions of this note were completed with a voice recognition program.  Efforts were made to edit the dictations but occasionally words are mis-transcribed.)    MARS Hennessy CNP (electronically signed)      Madie Dee APRN - CNP  11/20/24 0042